# Patient Record
Sex: FEMALE | Race: WHITE | NOT HISPANIC OR LATINO | URBAN - METROPOLITAN AREA
[De-identification: names, ages, dates, MRNs, and addresses within clinical notes are randomized per-mention and may not be internally consistent; named-entity substitution may affect disease eponyms.]

---

## 2022-09-20 ENCOUNTER — TRANSFERRED RECORDS (OUTPATIENT)
Dept: HEALTH INFORMATION MANAGEMENT | Facility: CLINIC | Age: 62
End: 2022-09-20

## 2022-09-28 ENCOUNTER — TRANSFERRED RECORDS (OUTPATIENT)
Dept: HEALTH INFORMATION MANAGEMENT | Facility: CLINIC | Age: 62
End: 2022-09-28

## 2022-10-04 ENCOUNTER — MEDICAL CORRESPONDENCE (OUTPATIENT)
Dept: HEALTH INFORMATION MANAGEMENT | Facility: CLINIC | Age: 62
End: 2022-10-04

## 2022-10-10 ENCOUNTER — TRANSCRIBE ORDERS (OUTPATIENT)
Dept: OTHER | Age: 62
End: 2022-10-10

## 2022-10-10 DIAGNOSIS — H71.90 CHOLESTEATOMA: Primary | ICD-10-CM

## 2022-10-25 NOTE — TELEPHONE ENCOUNTER
FUTURE VISIT INFORMATION      FUTURE VISIT INFORMATION:    Date: 12/9/22    Time: 8:40am    [place] [facility] [place]  REFERRAL INFORMATION:    Referring provider:   Dr Dawit Beverly     Referring providers clinic:  [facility] ENT/Facial Plastic Surgery    Reason for visit/diagnosis  Cholesteatoma  RECORDS REQUESTED FROM:       Clinic name Comments Records Status Imaging Status   [facility] ENT/Facial [facility] 9/20/22 note from Dr Beverly  9/26/19 note from Cyndi Lewis NP Request for recs sent 10/25/22 - sent to scan 11/7/22    [facility] Audiology  9/28/22 audiogram     Scanned in care everywhere   9/10/19 audiogram    Sent to scan 11/7/22    [facility] URGENT CARE   9/10/22 note from Nadia Monterroso, APRN, CNP   Care everywhere     [facility] brainerd imaging  9/28/22 CT Temporal Bone Care everywhere  req 11/7/22 - PACS                 - Request for recs sent to [facility] 10/25/22- Fax:  [phone]- [facility]   11/7/22 1:41PM sent recs to scan, sent a fax for images - Amay   11/9/22 images received in PACS - Amay

## 2022-12-08 DIAGNOSIS — Z01.10 ENCOUNTER FOR HEARING TEST: Primary | ICD-10-CM

## 2022-12-08 NOTE — PROGRESS NOTES
Neurotology Clinic      Name: Dennise Lee  MRN: [id number]  Age: 62 year old  : 1960  Referring provider: Dr. Dawit Beverly   2022     Chief Complaint:   Consultation    History of Present Illness:   Dennise Lee is a 62 year old female with a history of canal plasty for right ear cholesteatoma 5/10/2012 who presents for consultation regarding left ear cholesteatoma.  Consultation was requested by Dr Dawit Beverly. Patient presented to urgent care on 9/10/2022 with URI symptoms for 7 days and bilateral ear pain and was started on Augmentin and Ciprodex. Then, patient was seen by Dr. Beverly on 2022 and reported R>L ear plugging and decreased hearing as well as sharp pains in left ear. 2022 CT temporal bones showed findings suspicious for cholesteatoma.    Today, she reports that she has had a mastoidectomy at the [facility] in the . Since then, she had surgery with Dr. Quach in 5/10/2012. Since then, she had regular hearing tests, but her ear was not being otherwise monitored. In September, she had cold symptoms, then a few days later she felt like she had an ear infection and antibiotics were prescribed which she did not feel were helpful. The symptoms have improved, and her hearing has returned. She also reports a history of eustachian tube dysfunction during childhood.    Review of Systems:   Pertinent items are noted in HPI or as in patient entered ROS below, remainder of complete ROS is negative.   No flowsheet data found.     Active Medications:     Current Outpatient Medications:      cholecalciferol 25 MCG (1000 UT) TABS, Take 1,000 Units by mouth, Disp: , Rfl:       Allergies:   Atorvastatin      Past Medical History:  No past medical history on file.  There is no problem list on file for this patient.       Past Surgical History:  No past surgical history on file.    Family History:   No family history on file.      Social History:   Social History     Tobacco Use  "    Smoking status: Never     Smokeless tobacco: Never   Substance Use Topics     Alcohol use: Yes     Alcohol/week: 8.0 standard drinks     Types: 8 Standard drinks or equivalent per week        Physical Exam:   BP (!) 165/91   Temp 97.8  F (36.6  C)   Ht 1.575 m (5' 2\")   Wt 88 kg (194 lb)   SpO2 96%   BMI 35.48 kg/m         Constitutional:  The patient was accompanied, well-groomed, and in no acute distress.     Skin: Normal:  warm and pink without rash   Neurologic: Alert and oriented x 3.  CN's III-XII within normal limits.  Voice normal.    Psychiatric: The patient's affect was calm, cooperative, and appropriate.     Communication:  Normal; communicates verbally, normal voice quality.   Respiratory: Breathing comfortably without stridor or exertion of accessory muscles.    Head/Face:  No lesions or scars.    Eyes: Pupils were equal and reactive.  Extraocular movement intact.     Ears: Pinnae and tragus non-tender.          Otologic microscope exam:  Right ear: Well healed postauricular incisions, generous meatoplasty. Deep in canal wall down cavity, reconstructed TM is intact without landmarks, there is a protuberance inside the meatoplasty in the mastoid cavity with a yellow pointing head arising in the roof  of the mastoid cavity and in sinodural angle consistent with buried cholesteatoma  Left ear: Wax elevated from TM with pick. TM intact and without retraction. No evidence of cholesteatoma.     Audiogram:  AUDIOGRAM: She underwent an audiogram today. This demonstrated:      Right: Speech reception threshold is 55 dB with 100% word recognition. Tympanogram could not seal type   Left: Speech reception threshold is 35 dB with 100% word recognition. Tympanogram As type     Audiogram was independently reviewed    Imaging:  CT Temporal 9/28/22  IMPRESSION:   1. Significant amount of new soft tissue and/or fluid within the left middle ear. Findings could represent cholesteatoma. Recommend direct " visualization for further evaluation.   2. Increased soft tissue within the right middle ear, the area of the prior mastoidectomy. This is also indeterminate and could be postoperative in nature, but recurrent cholesteatoma is also possible.   3. Large left mastoid effusion.     Appearance of canal wall down mastoidectomy. Lining of CWD is quite thick and has an appearance of being bulged up by something. There is a tegmen dehiscence. Also a dehiscence over the descending facial nerve.    Outside records from  were independently reviewed.       Assessment and Plan:  Dennise Lee is a 62 year old female with a history of cholesteatoma,  mastoidectomy (1990's), canal plasty (5/10/2012). On her exam today, there is evidence of a recurrent cholesteatoma in the the right mastoid cavity. There is no evidence of cholesteatoma on the left. I recommended MRI for further evaluation of dehiscence and selection of best surgical option which will likely be revision canal wall down mastoidectomy. The risks and benefits were discussed.  The risks include but are not limited to:  Worsened hearing which may require further surgery, profound and irreversible hearing loss, dizziness, damage to the taste nerve, damage to the facial nerve, tympanic membrane perforation requiring further surgery and infection.  Postoperative restrictions were discussed. We also discussed the results of her audiogram, and she would be interested in trying a hearing aid after she heals from surgery.    Once imaging is available for review, we will work to schedule her for surgery.     Scribe Disclosure:  I, Dave Fine, am serving as a scribe to document services personally performed by Ana Brewer MD at this visit, based upon the provider's statements to me. All documentation has been reviewed by the aforementioned provider prior to being entered into the official medical record.    The documentation recorded by the scribe  accurately reflects the services I personally performed and the decisions made by me.    Ana Brewer MD  Otology & Neurotology  [facility]

## 2022-12-09 ENCOUNTER — OFFICE VISIT (OUTPATIENT)
Dept: AUDIOLOGY | Facility: CLINIC | Age: 62
End: 2022-12-09
Payer: COMMERCIAL

## 2022-12-09 ENCOUNTER — PRE VISIT (OUTPATIENT)
Dept: OTOLARYNGOLOGY | Facility: CLINIC | Age: 62
End: 2022-12-09

## 2022-12-09 ENCOUNTER — OFFICE VISIT (OUTPATIENT)
Dept: OTOLARYNGOLOGY | Facility: CLINIC | Age: 62
End: 2022-12-09
Payer: COMMERCIAL

## 2022-12-09 VITALS
BODY MASS INDEX: 35.7 KG/M2 | HEIGHT: 62 IN | DIASTOLIC BLOOD PRESSURE: 91 MMHG | WEIGHT: 194 LBS | OXYGEN SATURATION: 96 % | SYSTOLIC BLOOD PRESSURE: 165 MMHG | TEMPERATURE: 97.8 F

## 2022-12-09 DIAGNOSIS — H90.71 MIXED HEARING LOSS OF RIGHT EAR: Primary | ICD-10-CM

## 2022-12-09 DIAGNOSIS — H71.21 CHOLESTEATOMA OF MASTOID CAVITY, RIGHT: ICD-10-CM

## 2022-12-09 DIAGNOSIS — H90.0 CONDUCTIVE HEARING LOSS, BILATERAL: Primary | ICD-10-CM

## 2022-12-09 DIAGNOSIS — H90.5 SENSORINEURAL HEARING LOSS OF LEFT EAR: ICD-10-CM

## 2022-12-09 PROCEDURE — 99204 OFFICE O/P NEW MOD 45 MIN: CPT | Mod: 25 | Performed by: OTOLARYNGOLOGY

## 2022-12-09 PROCEDURE — 92550 TYMPANOMETRY & REFLEX THRESH: CPT | Performed by: AUDIOLOGIST

## 2022-12-09 PROCEDURE — 92557 COMPREHENSIVE HEARING TEST: CPT | Performed by: AUDIOLOGIST

## 2022-12-09 PROCEDURE — 92504 EAR MICROSCOPY EXAMINATION: CPT | Performed by: OTOLARYNGOLOGY

## 2022-12-09 ASSESSMENT — PAIN SCALES - GENERAL: PAINLEVEL: NO PAIN (0)

## 2022-12-09 NOTE — LETTER
2022       RE: Dennise Lee  [address]  [place] [address]     Dear Colleague,    Thank you for referring your patient, Dennise Lee, to the [facility] EAR NOSE AND THROAT CLINIC [place] [facility]. Please see a copy of my visit note below.      Neurotology Clinic      Name: Dennise Lee  MRN: [id number]  Age: 62 year old  : 1960  Referring provider: Dr. Dawit Beverly   2022     Chief Complaint:   Consultation    History of Present Illness:   Dennise Lee is a 62 year old female with a history of canal plasty for right ear cholesteatoma 5/10/2012 who presents for consultation regarding left ear cholesteatoma.  Consultation was requested by Dr Dawit Beverly. Patient presented to urgent care on 9/10/2022 with URI symptoms for 7 days and bilateral ear pain and was started on Augmentin and Ciprodex. Then, patient was seen by Dr. Beverly on 2022 and reported R>L ear plugging and decreased hearing as well as sharp pains in left ear. 2022 CT temporal bones showed findings suspicious for cholesteatoma.    Today, she reports that she has had a mastoidectomy at the [facility] in the . Since then, she had surgery with Dr. Quach in 5/10/2012. Since then, she had regular hearing tests, but her ear was not being otherwise monitored. In September, she had cold symptoms, then a few days later she felt like she had an ear infection and antibiotics were prescribed which she did not feel were helpful. The symptoms have improved, and her hearing has returned. She also reports a history of eustachian tube dysfunction during childhood.    Review of Systems:   Pertinent items are noted in HPI or as in patient entered ROS below, remainder of complete ROS is negative.   No flowsheet data found.     Active Medications:     Current Outpatient Medications:      cholecalciferol 25 MCG (1000 UT) TABS, Take 1,000 Units by  "mouth, Disp: , Rfl:       Allergies:   Atorvastatin      Past Medical History:  No past medical history on file.  There is no problem list on file for this patient.       Past Surgical History:  No past surgical history on file.    Family History:   No family history on file.      Social History:   Social History     Tobacco Use     Smoking status: Never     Smokeless tobacco: Never   Substance Use Topics     Alcohol use: Yes     Alcohol/week: 8.0 standard drinks     Types: 8 Standard drinks or equivalent per week        Physical Exam:   BP (!) 165/91   Temp 97.8  F (36.6  C)   Ht 1.575 m (5' 2\")   Wt 88 kg (194 lb)   SpO2 96%   BMI 35.48 kg/m         Constitutional:  The patient was accompanied, well-groomed, and in no acute distress.     Skin: Normal:  warm and pink without rash   Neurologic: Alert and oriented x 3.  CN's III-XII within normal limits.  Voice normal.    Psychiatric: The patient's affect was calm, cooperative, and appropriate.     Communication:  Normal; communicates verbally, normal voice quality.   Respiratory: Breathing comfortably without stridor or exertion of accessory muscles.    Head/Face:  No lesions or scars.    Eyes: Pupils were equal and reactive.  Extraocular movement intact.     Ears: Pinnae and tragus non-tender.          Otologic microscope exam:  Right ear: Well healed postauricular incisions, generous meatoplasty. Deep in canal wall down cavity, reconstructed TM is intact without landmarks, there is a protuberance inside the meatoplasty in the mastoid cavity with a yellow pointing head arising in the roof  of the mastoid cavity and in sinodural angle consistent with buried cholesteatoma  Left ear: Wax elevated from TM with pick. TM intact and without retraction. No evidence of cholesteatoma.     Audiogram:  AUDIOGRAM: She underwent an audiogram today. This demonstrated:      Right: Speech reception threshold is 55 dB with 100% word recognition. Tympanogram could not seal type "   Left: Speech reception threshold is 35 dB with 100% word recognition. Tympanogram As type     Audiogram was independently reviewed    Imaging:  CT Temporal 9/28/22  IMPRESSION:   1. Significant amount of new soft tissue and/or fluid within the left middle ear. Findings could represent cholesteatoma. Recommend direct visualization for further evaluation.   2. Increased soft tissue within the right middle ear, the area of the prior mastoidectomy. This is also indeterminate and could be postoperative in nature, but recurrent cholesteatoma is also possible.   3. Large left mastoid effusion.     Appearance of canal wall down mastoidectomy. Lining of CWD is quite thick and has an appearance of being bulged up by something. There is a tegmen dehiscence. Also a dehiscence over the descending facial nerve.    Outside records from  were independently reviewed.       Assessment and Plan:  Dennise Lee is a 62 year old female with a history of cholesteatoma,  mastoidectomy (1990's), canal plasty (5/10/2012). On her exam today, there is evidence of a recurrent cholesteatoma in the the right mastoid cavity. There is no evidence of cholesteatoma on the left. I recommended MRI for further evaluation of dehiscence and selection of best surgical option which will likely be revision canal wall down mastoidectomy. The risks and benefits were discussed.  The risks include but are not limited to:  Worsened hearing which may require further surgery, profound and irreversible hearing loss, dizziness, damage to the taste nerve, damage to the facial nerve, tympanic membrane perforation requiring further surgery and infection.  Postoperative restrictions were discussed. We also discussed the results of her audiogram, and she would be interested in trying a hearing aid after she heals from surgery.    Once imaging is available for review, we will work to schedule her for surgery.     Scribe Disclosure:  ROLANDO, Dave Fine,  am serving as a scribe to document services personally performed by Ana Brewer MD at this visit, based upon the provider's statements to me. All documentation has been reviewed by the aforementioned provider prior to being entered into the official medical record.    The documentation recorded by the scribe accurately reflects the services I personally performed and the decisions made by me.    Ana Brewer MD  Otology & Neurotology  [facility]        Again, thank you for allowing me to participate in the care of your patient.      Sincerely,    Ana Brewer MD

## 2022-12-09 NOTE — PATIENT INSTRUCTIONS
You were seen in the ENT Clinic today by Dr. Brewer. If you have any questions or concerns after your appointment, please contact us (see below)      2.   Please obtain an MRI. An order has been placed, let us know where you'd like to have this done.         How to Contact Us:  Send a [facility] message to your provider. Our team will respond to you via [facility]. Occasionally, we will need to call you to get further information.  For urgent matters (Monday-Friday), call the ENT Clinic: [phone] and speak with a call center team member - they will route your call appropriately.   If you'd like to speak directly with a nurse, please find our contact information below. We do our best to check voicemail frequently throughout the day, and will work to call you back within 1-2 days. For urgent matters, please use the general clinic phone numbers listed above.      Roseline LUDWIG RN  ENT RN Care Coordinator  Direct: [phone]    María Elena FRAGOSO LPN  Direct: [phone]

## 2022-12-09 NOTE — PROGRESS NOTES
AUDIOLOGY REPORT    SUMMARY: Audiology visit completed. See audiogram for results.      RECOMMENDATIONS: Follow-up with ENT.    Rachel Farley, [facility] [place]  Licensed Audiologist  [place] License #[id number]

## 2022-12-09 NOTE — LETTER
Date:December 12, 2022      Patient was self referred, no letter generated. Do not send.        [facility] Health Information

## 2023-09-02 ENCOUNTER — HEALTH MAINTENANCE LETTER (OUTPATIENT)
Age: 63
End: 2023-09-02

## 2024-09-17 ENCOUNTER — TRANSFERRED RECORDS (OUTPATIENT)
Dept: HEALTH INFORMATION MANAGEMENT | Facility: CLINIC | Age: 64
End: 2024-09-17

## 2024-09-17 ENCOUNTER — MEDICAL CORRESPONDENCE (OUTPATIENT)
Dept: HEALTH INFORMATION MANAGEMENT | Facility: CLINIC | Age: 64
End: 2024-09-17

## 2024-10-02 ENCOUNTER — TRANSFERRED RECORDS (OUTPATIENT)
Dept: HEALTH INFORMATION MANAGEMENT | Facility: CLINIC | Age: 64
End: 2024-10-02

## 2024-10-20 ENCOUNTER — HEALTH MAINTENANCE LETTER (OUTPATIENT)
Age: 64
End: 2024-10-20

## 2024-10-24 ENCOUNTER — TRANSFERRED RECORDS (OUTPATIENT)
Dept: HEALTH INFORMATION MANAGEMENT | Facility: CLINIC | Age: 64
End: 2024-10-24
Payer: COMMERCIAL

## 2024-10-29 ENCOUNTER — MEDICAL CORRESPONDENCE (OUTPATIENT)
Dept: HEALTH INFORMATION MANAGEMENT | Facility: CLINIC | Age: 64
End: 2024-10-29
Payer: COMMERCIAL

## 2024-12-09 DIAGNOSIS — Z01.10 ENCOUNTER FOR HEARING EXAMINATION: Primary | ICD-10-CM

## 2024-12-16 NOTE — PROGRESS NOTES
Neurotology Clinic      Name: Dennise Lee  MRN: [id number]  Age: 64 year old  : 2026     Chief Complaint:   Follow up     History of Present Illness:   Dennise Lee is a 62 year old female with a history of mastoidectomy in the  and canal plasty for right ear cholesteatoma 5/10/2012 who was previously seen on 2022 in consultation as requested by Dr Dawit Beverly for concern for cholesteatoma.  At that time there was concern for recurrent cholesteatoma on the right.  She had no further intervention and was not being routinely followed for issues.    Recently she developed complaints of intermittent right-sided drainage and feelings of fullness.  She had repeat imaging which demonstrated resolution of previously suspicious lesion.  She has been trialed on nasal saline and steroid sprays however has had persistence of symptoms.  Given persistent symptoms in the setting of prior canalplasty and mastoid bowl abnormalities she was referred.    She notes intermittent drainage near continuous feelings of fullness.  She has not had recent ear infections.  She does not currently have drainage.     Review of Systems:   Pertinent items are noted in HPI or as in patient entered ROS below, remainder of complete ROS is negative.       2024     2:30 PM    ENT ROS   Ears, Nose, Throat Ringing/noise in ears    Nasal congestion or drainage   Cardiopulmonary Cough    Breathing problems    Wheezing   Gastrointestinal/Genitourinary Heartburn/indigestion   Musculoskeletal Sore or stiff joints    Neck pain         Physical Exam:   Constitutional:  The patient was unaccompanied, well-groomed, and in no acute distress.     Skin: Normal:  warm and pink without rash   Neurologic: Alert and oriented x 3.  CN's III-XII within normal limits.  Voice normal.    Psychiatric: The patient's affect was calm, cooperative, and appropriate.     Communication:  Normal; communicates verbally, normal voice  quality.   Respiratory: Breathing comfortably without stridor or exertion of accessory muscles.    Head/Face:  No lesions or scars. No sinus tenderness.     Eyes: Pupils were equal and reactive.  Extraocular movement intact.     Ears: Pinnae and tragus non-tender.        Otologic microscope exam:  Right ear: External ear canal is clean and dry.  Posteriorly and medially there is a very of ballotable fullness that is bluish in color.  When palpated this is soft and appears to be fluid-filled.  It does not extend into the TM which is intact.  There are no areas of active drainage.  No evidence of retraction or recurrent cholesteatoma.  Left ear: External ear canal clean and dry.  TM intact with well aerated.    Audiogram:  AUDIOGRAM: She underwent an audiogram today. This demonstrated:       Right: Speech reception threshold is 100 dB with 100% word recognition. Tympanogram type not mentioned.  Left: Speech reception threshold is 70 dB with 96% word recognition. Tympanogram C type     Audiogram was independently reviewed    Imaging:  CT Temporal Bone IAC Mastoids w/o IV Contrast 10/2/24  IMPRESSION:   1. Again noted are postoperative changes of mastoidectomy on the right.     2. There remains significant mount of soft tissue within the operative cavity on the right although decreased when compared to prior study.     3. Overall decrease in the abnormal soft tissue previously noted in the expected region of the middle ear cavity on the right.     4. Significant reduction to near complete resolution of the previously noted fluid/soft tissue in the left middle ear cavity and left mastoid air cells.     5. Remainder unchanged and please see remain above comments.      Assessment and Plan:  Dennise Lee is a 62 year old female with a remote history of prior mastoidectomy as well as a canal plasty for right ear cholesteatoma on 5/10/2012.  She has since struggled with frequent fullness and intermittent drainage from  the right ear.  She has been given nasal steroid sprays without much benefit to this point.  Though we are unable to see the images, report is that there is no residual cholesteatoma and improved soft tissue fullness in the middle ear and mastoid.  On exam the right ear is dry and the tympanic membrane is intact.  There is ballotable fullness of the medial posterior canal that is bluish in color consistent with a blue dome cyst.  This is can form after canal surgery when the skin is not adherent to the underlying bone resulting in a potential space thickening of fill with fluid like products.  Though this can be drained via needle aspiration or incision and drainage, and in our experience it is likely to recur and can lead to a chronically draining ear which is disturbing to patients.  Therefore recommend continued monitoring and nasal sprays as already prescribed.    Follow-up: Recommend follow-up with prior ENT.  He can follow-up with neurotology as needed.     Reynaldo Cruz MD  ENT Resident    Ana Brewer MD  Otology & Neurotology  [facility]    I, Ana Brewer MD, saw this patient with the resident/fellow and agree with the resident s findings and plan of care as documented in the resident s/fellow s note. I was present for the entire procedure.

## 2024-12-17 ENCOUNTER — OFFICE VISIT (OUTPATIENT)
Dept: OTOLARYNGOLOGY | Facility: CLINIC | Age: 64
End: 2024-12-17
Attending: OTOLARYNGOLOGY
Payer: COMMERCIAL

## 2024-12-17 ENCOUNTER — TELEPHONE (OUTPATIENT)
Dept: OTOLARYNGOLOGY | Facility: CLINIC | Age: 64
End: 2024-12-17

## 2024-12-17 ENCOUNTER — OFFICE VISIT (OUTPATIENT)
Dept: AUDIOLOGY | Facility: CLINIC | Age: 64
End: 2024-12-17
Attending: OTOLARYNGOLOGY
Payer: COMMERCIAL

## 2024-12-17 DIAGNOSIS — H90.A31 MIXED CONDUCTIVE AND SENSORINEURAL HEARING LOSS OF RIGHT EAR WITH RESTRICTED HEARING OF LEFT EAR: Primary | ICD-10-CM

## 2024-12-17 DIAGNOSIS — H74.91 DISORDER OF RIGHT MASTOID: Primary | ICD-10-CM

## 2024-12-17 DIAGNOSIS — Z01.10 ENCOUNTER FOR HEARING EXAMINATION: ICD-10-CM

## 2024-12-17 PROCEDURE — 92504 EAR MICROSCOPY EXAMINATION: CPT | Mod: GC | Performed by: OTOLARYNGOLOGY

## 2024-12-17 PROCEDURE — 99213 OFFICE O/P EST LOW 20 MIN: CPT | Mod: 25 | Performed by: OTOLARYNGOLOGY

## 2024-12-17 RX ORDER — METRONIDAZOLE 7.5 MG/G
GEL TOPICAL
COMMUNITY
Start: 2024-01-10

## 2024-12-17 RX ORDER — ASCORBIC ACID 125 MG
2 TABLET,CHEWABLE ORAL
COMMUNITY

## 2024-12-17 RX ORDER — MAGNESIUM GLYCINATE 100 MG
CAPSULE ORAL
COMMUNITY
Start: 2023-08-01

## 2024-12-17 RX ORDER — DOXYCYCLINE HYCLATE 20 MG
20 TABLET ORAL
COMMUNITY
Start: 2024-07-03

## 2024-12-17 RX ORDER — MONTELUKAST SODIUM 10 MG/1
1 TABLET ORAL EVERY EVENING
COMMUNITY

## 2024-12-17 RX ORDER — SIMVASTATIN 20 MG
20 TABLET ORAL AT BEDTIME
COMMUNITY

## 2024-12-17 ASSESSMENT — PAIN SCALES - GENERAL: PAINLEVEL_OUTOF10: NO PAIN (0)

## 2024-12-17 NOTE — LETTER
2024       RE: Dennise Lee  [address]  [place] [address]     Dear Colleague,    Thank you for referring your patient, Dennise Lee, to the [facility] [place] [facility]. Please see a copy of my visit note below.      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  At that time there was concern for recurrent cholesteatoma on the right.  She had no further intervention and was not being routinely followed for issues.    Recently she developed complaints of intermittent right-sided drainage and feelings of fullness.  She had repeat imaging which demonstrated resolution of previously suspicious lesion.  She has been trialed on nasal saline and steroid sprays however has had persistence of symptoms.  Given persistent symptoms in the setting of prior canalplasty and mastoid bowl abnormalities she was referred.    She notes intermittent drainage near continuous feelings of fullness.  She has not had recent ear infections.  She does not currently have drainage.     Review of Systems:   Pertinent items are noted in HPI or as in patient entered ROS below, remainder of complete ROS is negative.       2024     2:30 PM    ENT ROS   Ears, Nose, Throat Ringing/noise in ears    Nasal congestion or drainage   Cardiopulmonary Cough    Breathing problems    Wheezing   Gastrointestinal/Genitourinary Heartburn/indigestion   Musculoskeletal Sore or stiff joints    Neck pain         Physical Exam:   Constitutional:  The patient was  unaccompanied, well-groomed, and in no acute distress.     Skin: Normal:  warm and pink without rash   Neurologic: Alert and oriented x 3.  CN's III-XII within normal limits.  Voice normal.    Psychiatric: The patient's affect was calm, cooperative, and appropriate.     Communication:  Normal; communicates verbally, normal voice quality.   Respiratory: Breathing comfortably without stridor or exertion of accessory muscles.    Head/Face:  No lesions or scars. No sinus tenderness.     Eyes: Pupils were equal and reactive.  Extraocular movement intact.     Ears: Pinnae and tragus non-tender.        Otologic microscope exam:  Right ear: External ear canal is clean and dry.  Posteriorly and medially there is a very of ballotable fullness that is bluish in color.  When palpated this is soft and appears to be fluid-filled.  It does not extend into the TM which is intact.  There are no areas of active drainage.  No evidence of retraction or recurrent cholesteatoma.  Left ear: External ear canal clean and dry.  TM intact with well aerated.    Audiogram:  AUDIOGRAM: She underwent an audiogram today. This demonstrated:       Right: Speech reception threshold is 100 dB with 100% word recognition. Tympanogram type not mentioned.  Left: Speech reception threshold is 70 dB with 96% word recognition. Tympanogram C type     Audiogram was independently reviewed    Imaging:  CT Temporal Bone IAC Mastoids w/o IV Contrast 10/2/24  IMPRESSION:   1. Again noted are postoperative changes of mastoidectomy on the right.     2. There remains significant mount of soft tissue within the operative cavity on the right although decreased when compared to prior study.     3. Overall decrease in the abnormal soft tissue previously noted in the expected region of the middle ear cavity on the right.     4. Significant reduction to near complete resolution of the previously noted fluid/soft tissue in the left middle ear cavity and left mastoid air  cells.     5. Remainder unchanged and please see remain above comments.      Assessment and Plan:  Dennise Lee is a 62 year old female with a remote history of prior mastoidectomy as well as a canal plasty for right ear cholesteatoma on 5/10/2012.  She has since struggled with frequent fullness and intermittent drainage from the right ear.  She has been given nasal steroid sprays without much benefit to this point.  Though we are unable to see the images, report is that there is no residual cholesteatoma and improved soft tissue fullness in the middle ear and mastoid.  On exam the right ear is dry and the tympanic membrane is intact.  There is ballotable fullness of the medial posterior canal that is bluish in color consistent with a blue dome cyst.  This is can form after canal surgery when the skin is not adherent to the underlying bone resulting in a potential space thickening of fill with fluid like products.  Though this can be drained via needle aspiration or incision and drainage, and in our experience it is likely to recur and can lead to a chronically draining ear which is disturbing to patients.  Therefore recommend continued monitoring and nasal sprays as already prescribed.    Follow-up: Recommend follow-up with prior ENT.  He can follow-up with neurotology as needed.     Reynaldo Cruz MD  ENT Resident    Ana Brewer MD  Otology & Neurotology  [facility]    I, Ana Brewer MD, saw this patient with the resident/fellow and agree with the resident s findings and plan of care as documented in the resident s/fellow s note. I was present for the entire procedure.           Again, thank you for allowing me to participate in the care of your patient.      Sincerely,    Ana Brewer MD

## 2024-12-17 NOTE — TELEPHONE ENCOUNTER
Records Requested 12/17/2024 at 11:33 AM  [id number]   Comment VENKATA [facility] Radiology CT    MRN [id number]    Report in CE. Image requested.    10/02/2024 CT TEMPORAL BONE IACS     Images resolved in pacs

## 2024-12-17 NOTE — PROGRESS NOTES
AUDIOLOGY REPORT    SUMMARY: Audiology visit completed. See audiogram for results.      RECOMMENDATIONS: Follow-up with ENT.    Wolfgang Tee, CCC-A  Clinical Audiologist  [place] #[id number]

## 2024-12-17 NOTE — PATIENT INSTRUCTIONS
You were seen in the ENT Clinic today by Dr. Brewer. If you have any questions or concerns after your appointment, please contact us (see below)      2.   Please return to clinic as needed.         How to Contact Us:  Send a [employer] message to your provider. Our team will respond to you via [employer]. Occasionally, we will need to call you to get further information.  For urgent matters (Monday-Friday), call the ENT Clinic: [phone] and speak with a call center team member - they will route your call appropriately.   If you'd like to speak directly with a nurse, please find our contact information below. We do our best to check voicemail frequently throughout the day, and will work to call you back within 1-2 days. For urgent matters, please use the general clinic phone numbers listed above.      Roseline LUDWIG RN  ENT RN Care Coordinator  Direct: [phone]    María Elena FRAGOSO LPN  Direct: [phone]